# Patient Record
Sex: MALE | Race: WHITE
[De-identification: names, ages, dates, MRNs, and addresses within clinical notes are randomized per-mention and may not be internally consistent; named-entity substitution may affect disease eponyms.]

---

## 2018-02-22 ENCOUNTER — HOSPITAL ENCOUNTER (EMERGENCY)
Dept: HOSPITAL 31 - C.ER | Age: 68
Discharge: HOME | End: 2018-02-22
Payer: COMMERCIAL

## 2018-02-22 VITALS
HEART RATE: 66 BPM | SYSTOLIC BLOOD PRESSURE: 103 MMHG | DIASTOLIC BLOOD PRESSURE: 67 MMHG | RESPIRATION RATE: 20 BRPM | OXYGEN SATURATION: 98 %

## 2018-02-22 VITALS — TEMPERATURE: 99.3 F

## 2018-02-22 DIAGNOSIS — M54.5: ICD-10-CM

## 2018-02-22 DIAGNOSIS — J11.1: Primary | ICD-10-CM

## 2018-02-22 PROCEDURE — 99283 EMERGENCY DEPT VISIT LOW MDM: CPT

## 2018-02-22 PROCEDURE — 96372 THER/PROPH/DIAG INJ SC/IM: CPT

## 2018-02-22 NOTE — C.PDOC
History Of Present Illness


67 year old male with a Hx of chronic lower back pain presents to the ER with a 

complaint of weakness and fatigue. [place] [facility] [place] 

diagn[place] with the flu, he had blood work which was negative and discharged 

home with medications. Denies bladder/bowel incontinence, motor weakness or 

recent injury. Patient not taking any medication for his chronic back pain, is 

taking Tamiflu.


Time Seen by Provider: 02/22/18 15:26


Chief Complaint (Nursing): Flu-like Symptoms


History Per: Patient


History/Exam Limitations: no limitations


Onset/Duration Of Symptoms: Days


Current Symptoms Are (Timing): Still Present


Location Of Pain: None


Sick Contacts (Context): None


Associated Symptoms: Other (Weakness, fatigue)


Ear Symptoms: Bilateral: None


Recent travel outside of the [place]: No





Past Medical History


Reviewed: Historical Data, Nursing Documentation, Vital Signs


Vital Signs: 


 Last Vital Signs











Temp  102.5 F H  02/22/18 15:26


 


Pulse  66   02/22/18 15:16


 


Resp  20   02/22/18 15:16


 


BP  103/67   02/22/18 15:16


 


Pulse Ox  98   02/22/18 16:23














- flexReceipts Procedures








INSERT INDWELLING CATH (01/10/14)








Family History: States: Unknown Family Hx





- Social History


Hx Tobacco Use: No


Hx Alcohol Use: No


Hx Substance Use: No





- Immunization History


Hx Tetanus Toxoid Vaccination: No


Hx Influenza Vaccination: No


Hx Pneumococcal Vaccination: No





Review Of Systems


Except As Marked, All Systems Reviewed And Found Negative.


Constitutional: Positive for: Weakness, Other (Fatigue)


Genitourinary: Negative for: Incontinence





Physical Exam





- Physical Exam


Additional Physical Exam Comments: 





Constitutional: No acute distress.


Head: Normocephalic. Atraumatic.


Eyes: PERRL.


ENT: Moist mucous membranes.


Neck: Supple.


Cardiovascular: Regular rate. Radial pulse 2+ bilaterally.


Chest: No tenderness.


Respiratory: Clear to auscultation bilaterally.


GI: Soft. Nontender. Nondistended.


Back: No CVA tenderness.


Musculoskeletal: No tenderness or swelling of extremities.


Skin: No rash.


Neurologic: Alert, no focal deficit. Moves all extremities. Sensation to light 

touch intact in bilateral legs and saddle area.





ED Course And Treatment


O2 Sat by Pulse Oximetry: 98 (room air)


Pulse Ox Interpretation: Normal





Medical Decision Making


Medical Decision Making: 


Advised ibuprofen and acetaminophen around the clock for pain control in 

addition to tamiflu. Offered blood work to check for dehydration or other 

abnormalities but patient/caretaker declined given just taken yesterday and 

completely normal as per them.





Disposition





- Disposition


Referrals: 


Calvin Ndiaye MD [Medical Doctor] - 


Disposition: HOME/ ROUTINE


Disposition Time: 16:49


Condition: STABLE


Prescriptions: 


Acetaminophen [Tylenol 325mg tab] 2 tab PO Q4H #30 tab


Famotidine [Pepcid] 1 tab PO BID #28 tab


Ibuprofen [Motrin] 1 tab PO Q6 #30 tab


Instructions:  Low Back Pain  (DC)


Forms:  Gen Discharge Inst [place], [facility] ([place])


Print Language: German





- Clinical Impression


Clinical Impression: 


 Influenza, Back pain








- Scribe Statement


The provider has reviewed the documentation as recorded by the Scribe





Lito Milton





All medical record entries made by the Scribe were at my direction and 

personally dictated by me. I have reviewed the chart and agree that the record 

accurately reflects my personal performance of the history, physical exam, 

medical decision making, and the department course for this patient. I have 

also personally directed, reviewed, and agree with the discharge instructions 

and disposition.